# Patient Record
(demographics unavailable — no encounter records)

---

## 2025-06-10 NOTE — HISTORY OF PRESENT ILLNESS
[FreeTextEntry1] : The patient is a 71 year  old woman who was recently noted on Diagnostic mammogram and ultrasound on 4/9/2025 showed a highly suspicious 2.2 cm mass identified at 7:00 to 8:00 6cm from nipple in the right breast and  a 1.3cm indeterminate mass at 7:00 1cm from nipple right breast. Ultrasound guided biopsy recommended.  On 4/14/2025, she had a biopsy of the two right breast abnormalities  at 7-8 oclock and at 7:00.  Pathology at right rbeast 7-8 o'clock showed invasive ductal carcinoma (GATA3 positive), moderately differentiated with focal mucinous features, 10.0 mm in the largest single microscopic measures. In the right 7:00 core biopsy, it showed Invasive ductal carcinoma, moderately differentiated with mucinous features, 5.0mm in the largest single microscopic measure.   It was ER +/SC+/HER 2 negative.  Ki-67 index was 15-20%.   She also had an MRI of bilateral breasts on 4/21/2025, which showed two sites of known carcinoma in right breast, measure 1.7cm at the 7:00 position, 1cm from the nipple and 2.3cm at the 8:00 position, 6cm from the nipple. No other suspicious enhancement in the right breast. No MR evidence for malignancy in the left breast.  On 5/5/2025, She underwent a lumpectomy and sentinel node biopsy: (slides reviewed at [facility]) 1.Breast, right, lumpectomy: Multiple foci of invasive ductal carcinoma with mucinous features and ductal carcinoma in situ (DCIS) Invasive carcinoma is present as two foci measuring 17mm and 11 mm microscopically  Invasive carcinoma is moderately differentiated, Valery grade 2 (3,2,2) DCIS is solid and cribriform types with low to intermediate nuclear grade, calcifications, and mucin production No lymphovascualr invasion identified Invasive carcinoma extends to the posterior margin in the specimen  Invasive carcinoma is <1 mm from the inferior margin in this specimen Invasive carcinoma is 1mm from the anterior margin Invasive carcinoma is > 2mm from remaining margins DCIS extends to the posterior margin in this specimen DCIS is < 1mm from the inferior margin of the specimen DCIS is > 2mm from the remaining margins The surrounding breast parenchyma shows atypical ductal hyperplasia (ADH) biopsy site changes are present  calcifications are present in benign breast tissue 2. Breast, right, additional posterior inferior lateral margin: Invasive ductal carcinoma with mucinous features, morphology similar to part #1 Invasive carcinoma measures 3.5mm microscopically  No lymphovascular invasion identified  No involvement of the surgical margins by carcinoma Invasive carcinoma is > 2mm from all margins calcifications are present in benign breast tissue Benign skeletal muscle 3. 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 But she does live on [address] and so they wanted to meet with Rad onc closer to home. She feels well after surgery. She had a seroma after surgery and breast surgeon drained initially about 60cc, and today they had another follow up with breast surgeon. It had improved and drained about 15ml today.    Her surgery was with Dr Nilton Tang at [facility].  Med/Onc: Dr. Jordan at [facility] initial appointment next week Oncotype: 11 (seen on daughters' phone, she will email results)

## 2025-06-10 NOTE — PHYSICAL EXAM
[General Appearance - Well Developed] : well developed [General Appearance - Alert] : alert [General Appearance - In No Acute Distress] : in no acute distress [Sclera] : the sclera and conjunctiva were normal [Examination Of The Oral Cavity] : the lips and gums were normal [] : no respiratory distress [Heart Rate And Rhythm] : heart rate and rhythm were normal [Skin Color & Pigmentation] : normal skin color and pigmentation [No Focal Deficits] : no focal deficits [Oriented To Time, Place, And Person] : oriented to person, place, and time [de-identified] : healing well right breast/ seroma drained for the second time today with breast surgeon at [facility]

## 2025-06-10 NOTE — HISTORY OF PRESENT ILLNESS
[FreeTextEntry1] : The patient is a 71 year  old woman who was recently noted on Diagnostic mammogram and ultrasound on 4/9/2025 showed a highly suspicious 2.2 cm mass identified at 7:00 to 8:00 6cm from nipple in the right breast and  a 1.3cm indeterminate mass at 7:00 1cm from nipple right breast. Ultrasound guided biopsy recommended.  On 4/14/2025, she had a biopsy of the two right breast abnormalities  at 7-8 oclock and at 7:00.  Pathology at right rbeast 7-8 o'clock showed invasive ductal carcinoma (GATA3 positive), moderately differentiated with focal mucinous features, 10.0 mm in the largest single microscopic measures. In the right 7:00 core biopsy, it showed Invasive ductal carcinoma, moderately differentiated with mucinous features, 5.0mm in the largest single microscopic measure.   It was ER +/[place]+/HER 2 negative.  Ki-67 index was 15-20%.   She also had an MRI of bilateral breasts on 4/21/2025, which showed two sites of known carcinoma in right breast, measure 1.7cm at the 7:00 position, 1cm from the nipple and 2.3cm at the 8:00 position, 6cm from the nipple. No other suspicious enhancement in the right breast. No MR evidence for malignancy in the left breast.  On 5/5/2025, She underwent a lumpectomy and sentinel node biopsy: (slides reviewed at [facility]) 1.Breast, right, lumpectomy: Multiple foci of invasive ductal carcinoma with mucinous features and ductal carcinoma in situ (DCIS) Invasive carcinoma is present as two foci measuring 17mm and 11 mm microscopically  Invasive carcinoma is moderately differentiated, Valery grade 2 (3,2,2) DCIS is solid and cribriform types with low to intermediate nuclear grade, calcifications, and mucin production No lymphovascualr invasion identified Invasive carcinoma extends to the posterior margin in the specimen  Invasive carcinoma is <1 mm from the inferior margin in this specimen Invasive carcinoma is 1mm from the anterior margin Invasive carcinoma is > 2mm from remaining margins DCIS extends to the posterior margin in this specimen DCIS is < 1mm from the inferior margin of the specimen DCIS is > 2mm from the remaining margins The surrounding breast parenchyma shows atypical ductal hyperplasia (ADH) biopsy site changes are present  calcifications are present in benign breast tissue 2. Breast, right, additional posterior inferior lateral margin: Invasive ductal carcinoma with mucinous features, morphology similar to part #1 Invasive carcinoma measures 3.5mm microscopically  No lymphovascular invasion identified  No involvement of the surgical margins by carcinoma Invasive carcinoma is > 2mm from all margins calcifications are present in benign breast tissue Benign skeletal muscle 3. 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 But she does live on [place] and so they wanted to meet with Rad onc closer to home. She feels well after surgery. She had a seroma after surgery and breast surgeon drained initially about 60cc, and today they had another follow up with breast surgeon. It had improved and drained about 15ml today.    Her surgery was with Dr Nilton Tang at [facility].  Med/Onc: Dr. Jordan at [facility] initial appointment next week Oncotype: 11 (seen on daughters' phone, she will email results)

## 2025-06-10 NOTE — DISEASE MANAGEMENT
[IA] : IA [FreeTextEntry4] : right breast IDC, ER+, [place]+, Her2 negative [TTNM] : 1c [NTNM] : 1mi [MTNM] : x

## 2025-06-10 NOTE — REVIEW OF SYSTEMS
[Negative] : Allergic/Immunologic [FreeTextEntry3] : glasses [de-identified] : h/o stroke 2021/ with stent on brilinta and asa [de-identified] : on Brilinta and ASA

## 2025-06-10 NOTE — PHYSICAL EXAM
[General Appearance - Well Developed] : well developed [General Appearance - Alert] : alert [General Appearance - In No Acute Distress] : in no acute distress [Sclera] : the sclera and conjunctiva were normal [Examination Of The Oral Cavity] : the lips and gums were normal [] : no respiratory distress [Heart Rate And Rhythm] : heart rate and rhythm were normal [Skin Color & Pigmentation] : normal skin color and pigmentation [No Focal Deficits] : no focal deficits [Oriented To Time, Place, And Person] : oriented to person, place, and time [de-identified] : healing well right breast/ seroma drained for the second time today with breast surgeon at [facility]

## 2025-06-10 NOTE — REVIEW OF SYSTEMS
[Negative] : Allergic/Immunologic [FreeTextEntry3] : glasses [de-identified] : h/o stroke 2021/ with stent on brilinta and asa [de-identified] : on Brilinta and ASA

## 2025-06-10 NOTE — DISEASE MANAGEMENT
[IA] : IA [FreeTextEntry4] : right breast IDC, ER+, [place]+, Her2 negative [TTNM] : 1c [NTNM] : 1mi [MTNM] : x

## 2025-06-10 NOTE — REVIEW OF SYSTEMS
[Negative] : Allergic/Immunologic [FreeTextEntry3] : glasses [de-identified] : on Brilinta and ASA [de-identified] : h/o stroke 2021/ with stent on brilinta and asa

## 2025-06-10 NOTE — DISEASE MANAGEMENT
[IA] : IA [FreeTextEntry4] : right breast IDC, ER+, [place]+, Her2 negative [TTNM] : 1c [NTNM] : 1mi [MTNM] : x

## 2025-06-10 NOTE — PHYSICAL EXAM
[General Appearance - Well Developed] : well developed [General Appearance - Alert] : alert [General Appearance - In No Acute Distress] : in no acute distress [Sclera] : the sclera and conjunctiva were normal [Examination Of The Oral Cavity] : the lips and gums were normal [] : no respiratory distress [Heart Rate And Rhythm] : heart rate and rhythm were normal [Skin Color & Pigmentation] : normal skin color and pigmentation [No Focal Deficits] : no focal deficits [Oriented To Time, Place, And Person] : oriented to person, place, and time [de-identified] : healing well right breast/ seroma drained for the second time today with breast surgeon at [facility]

## 2025-06-10 NOTE — REVIEW OF SYSTEMS
[Negative] : Allergic/Immunologic [FreeTextEntry3] : glasses [de-identified] : on Brilinta and ASA [de-identified] : h/o stroke 2021/ with stent on brilinta and asa

## 2025-06-10 NOTE — HISTORY OF PRESENT ILLNESS
[FreeTextEntry1] : The patient is a 71 year  old woman who was recently noted on Diagnostic mammogram and ultrasound on 4/9/2025 showed a highly suspicious 2.2 cm mass identified at 7:00 to 8:00 6cm from nipple in the right breast and  a 1.3cm indeterminate mass at 7:00 1cm from nipple right breast. Ultrasound guided biopsy recommended.  On 4/14/2025, she had a biopsy of the two right breast abnormalities  at 7-8 oclock and at 7:00.  Pathology at right rbeast 7-8 o'clock showed invasive ductal carcinoma (GATA3 positive), moderately differentiated with focal mucinous features, 10.0 mm in the largest single microscopic measures. In the right 7:00 core biopsy, it showed Invasive ductal carcinoma, moderately differentiated with mucinous features, 5.0mm in the largest single microscopic measure.   It was ER +/[place]+/HER 2 negative.  Ki-67 index was 15-20%.   She also had an MRI of bilateral breasts on 4/21/2025, which showed two sites of known carcinoma in right breast, measure 1.7cm at the 7:00 position, 1cm from the nipple and 2.3cm at the 8:00 position, 6cm from the nipple. No other suspicious enhancement in the right breast. No MR evidence for malignancy in the left breast.  On 5/5/2025, She underwent a lumpectomy and sentinel node biopsy: (slides reviewed at [facility]) 1.Breast, right, lumpectomy: Multiple foci of invasive ductal carcinoma with mucinous features and ductal carcinoma in situ (DCIS) Invasive carcinoma is present as two foci measuring 17mm and 11 mm microscopically  Invasive carcinoma is moderately differentiated, Valery grade 2 (3,2,2) DCIS is solid and cribriform types with low to intermediate nuclear grade, calcifications, and mucin production No lymphovascualr invasion identified Invasive carcinoma extends to the posterior margin in the specimen  Invasive carcinoma is <1 mm from the inferior margin in this specimen Invasive carcinoma is 1mm from the anterior margin Invasive carcinoma is > 2mm from remaining margins DCIS extends to the posterior margin in this specimen DCIS is < 1mm from the inferior margin of the specimen DCIS is > 2mm from the remaining margins The surrounding breast parenchyma shows atypical ductal hyperplasia (ADH) biopsy site changes are present  calcifications are present in benign breast tissue 2. Breast, right, additional posterior inferior lateral margin: Invasive ductal carcinoma with mucinous features, morphology similar to part #1 Invasive carcinoma measures 3.5mm microscopically  No lymphovascular invasion identified  No involvement of the surgical margins by carcinoma Invasive carcinoma is > 2mm from all margins calcifications are present in benign breast tissue Benign skeletal muscle 3. 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 But she does live on [place] and so they wanted to meet with Rad onc closer to home. She feels well after surgery. She had a seroma after surgery and breast surgeon drained initially about 60cc, and today they had another follow up with breast surgeon. It had improved and drained about 15ml today.    Her surgery was with Dr Nilton Tang at [facility].  Med/Onc: Dr. Jordan at [facility] initial appointment next week Oncotype: 11 (seen on daughters' phone, she will email results)

## 2025-06-10 NOTE — REASON FOR VISIT
[Consideration of Curative Therapy] : consideration of curative therapy for [Breast Cancer] : breast cancer [Family Member] : family member [Interpreters_FullName] : Dahiana [Interpreters_Relationshiptopatient] : daughter [TWNoteComboBox1] : Preston

## 2025-06-10 NOTE — HISTORY OF PRESENT ILLNESS
[FreeTextEntry1] : The patient is a 71 year  old woman who was recently noted on Diagnostic mammogram and ultrasound on 4/9/2025 showed a highly suspicious 2.2 cm mass identified at 7:00 to 8:00 6cm from nipple in the right breast and  a 1.3cm indeterminate mass at 7:00 1cm from nipple right breast. Ultrasound guided biopsy recommended.  On 4/14/2025, she had a biopsy of the two right breast abnormalities  at 7-8 oclock and at 7:00.  Pathology at right rbeast 7-8 o'clock showed invasive ductal carcinoma (GATA3 positive), moderately differentiated with focal mucinous features, 10.0 mm in the largest single microscopic measures. In the right 7:00 core biopsy, it showed Invasive ductal carcinoma, moderately differentiated with mucinous features, 5.0mm in the largest single microscopic measure.   It was ER +/[place]+/HER 2 negative.  Ki-67 index was 15-20%.   She also had an MRI of bilateral breasts on 4/21/2025, which showed two sites of known carcinoma in right breast, measure 1.7cm at the 7:00 position, 1cm from the nipple and 2.3cm at the 8:00 position, 6cm from the nipple. No other suspicious enhancement in the right breast. No MR evidence for malignancy in the left breast.  On 5/5/2025, She underwent a lumpectomy and sentinel node biopsy: (slides reviewed at [facility]) 1.Breast, right, lumpectomy: Multiple foci of invasive ductal carcinoma with mucinous features and ductal carcinoma in situ (DCIS) Invasive carcinoma is present as two foci measuring 17mm and 11 mm microscopically  Invasive carcinoma is moderately differentiated, Valrey grade 2 (3,2,2) DCIS is solid and cribriform types with low to intermediate nuclear grade, calcifications, and mucin production No lymphovascualr invasion identified Invasive carcinoma extends to the posterior margin in the specimen  Invasive carcinoma is <1 mm from the inferior margin in this specimen Invasive carcinoma is 1mm from the anterior margin Invasive carcinoma is > 2mm from remaining margins DCIS extends to the posterior margin in this specimen DCIS is < 1mm from the inferior margin of the specimen DCIS is > 2mm from the remaining margins The surrounding breast parenchyma shows atypical ductal hyperplasia (ADH) biopsy site changes are present  calcifications are present in benign breast tissue 2. Breast, right, additional posterior inferior lateral margin: Invasive ductal carcinoma with mucinous features, morphology similar to part #1 Invasive carcinoma measures 3.5mm microscopically  No lymphovascular invasion identified  No involvement of the surgical margins by carcinoma Invasive carcinoma is > 2mm from all margins calcifications are present in benign breast tissue Benign skeletal muscle 3. 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 But she does live on [place] and so they wanted to meet with Rad onc closer to home. She feels well after surgery. She had a seroma after surgery and breast surgeon drained initially about 60cc, and today they had another follow up with breast surgeon. It had improved and drained about 15ml today.    Her surgery was with Dr Nilton Tang at [facility].  Med/Onc: Dr. Jordan at [facility] initial appointment next week Oncotype: 11 (seen on daughters' phone, she will email results)

## 2025-06-10 NOTE — DISEASE MANAGEMENT
[IA] : IA [FreeTextEntry4] : right breast IDC, ER+, [place]+, Her2 negative [TTNM] : 1c [NTNM] : 1mi [MTNM] : x

## 2025-06-10 NOTE — PHYSICAL EXAM
[General Appearance - Well Developed] : well developed [General Appearance - Alert] : alert [General Appearance - In No Acute Distress] : in no acute distress [Sclera] : the sclera and conjunctiva were normal [Examination Of The Oral Cavity] : the lips and gums were normal [] : no respiratory distress [Heart Rate And Rhythm] : heart rate and rhythm were normal [Skin Color & Pigmentation] : normal skin color and pigmentation [No Focal Deficits] : no focal deficits [Oriented To Time, Place, And Person] : oriented to person, place, and time [de-identified] : healing well right breast/ seroma drained for the second time today with breast surgeon at [facility]

## 2025-06-10 NOTE — END OF VISIT
[FreeTextEntry3] : MD Note: I personally performed the evaluation and management services for this patient. This includes conducting the examination, assessing all conditions, and establishing the plan of care. Today, my ACP, Shima Pena, was here to observe my evaluation and management services for this patient. I agree with the documentation above, which I have reviewed and edited where appropriate.  [Time Spent: ___ minutes] : I have spent [unfilled] minutes of time on the encounter which excludes teaching and separately reported services.

## 2025-07-01 NOTE — REASON FOR VISIT
[Routine On-Treatment] : a routine on-treatment visit for [Breast Cancer] : breast cancer [Family Member] : family member [Patient Declined  Services] : - None: Patient declined  services [Interpreters_FullName] : Dahiana [Interpreters_Relationshiptopatient] : daughter [TWNoteComboBox1] : Preston

## 2025-07-01 NOTE — HISTORY OF PRESENT ILLNESS
[FreeTextEntry1] : 7/1/2025 OTV: 3/16 treatments to the right breast. Patient feeling well. Patient speaks Mandarin and so she facetimed her daughter at this visit. She has started moisturizing the right breast as advised. She denies any pain. Denies any fatigue.

## 2025-07-01 NOTE — PHYSICAL EXAM
[General Appearance - Well Developed] : well developed [General Appearance - Alert] : alert [General Appearance - In No Acute Distress] : in no acute distress [Sclera] : the sclera and conjunctiva were normal [Examination Of The Oral Cavity] : the lips and gums were normal [] : no respiratory distress [Musculoskeletal - Swelling] : no joint swelling [Skin Color & Pigmentation] : normal skin color and pigmentation [No Focal Deficits] : no focal deficits [Oriented To Time, Place, And Person] : oriented to person, place, and time [de-identified] : no erythema noted

## 2025-07-01 NOTE — DISEASE MANAGEMENT
[Pathological] : TNM Stage: p [IA] : IA [FreeTextEntry4] : right breast IDC, ER+, [place]+, Her2 negative [TTNM] : 1c [NTNM] : 1mi [MTNM] : x [de-identified] : [id number] [de-identified] : [id number] [de-identified] : Right Breast

## 2025-07-08 NOTE — HISTORY OF PRESENT ILLNESS
[FreeTextEntry1] : 7/08/2025 OTV: 7/16 treatments to the right breast. Patient feeling well. Denies any fatigue. Denies any breast pain. She is moisturizing twice a day. She has no concerns at this time.   7/1/2025 OTV: 3/16 treatments to the right breast. Patient feeling well. Patient speaks Mandarin and so she facetimed her daughter at this visit. She has started moisturizing the right breast as advised. She denies any pain. Denies any fatigue.

## 2025-07-08 NOTE — DISEASE MANAGEMENT
[Pathological] : TNM Stage: p [IA] : IA [FreeTextEntry4] : right breast IDC, ER+, [place]+, Her2 negative [TTNM] : 1c [NTNM] : 1mi [MTNM] : x [de-identified] : [id number] [de-identified] : [id number] [de-identified] : Right Breast

## 2025-07-08 NOTE — PHYSICAL EXAM
[General Appearance - Well Developed] : well developed [General Appearance - Alert] : alert [General Appearance - In No Acute Distress] : in no acute distress [Sclera] : the sclera and conjunctiva were normal [Examination Of The Oral Cavity] : the lips and gums were normal [] : no respiratory distress [Oriented To Time, Place, And Person] : oriented to person, place, and time [de-identified] : no erythema

## 2025-07-08 NOTE — REASON FOR VISIT
[Routine On-Treatment] : a routine on-treatment visit for [Breast Cancer] : breast cancer [Family Member] : family member [Other: ______] : provided by REYNA [Interpreters_IDNumber] : [id number] [Interpreters_FullName] : Jeff [FreeTextEntry3] : Mandarin [TWNoteComboBox1] : Other

## 2025-07-15 NOTE — REASON FOR VISIT
[Routine On-Treatment] : a routine on-treatment visit for [Breast Cancer] : breast cancer [Family Member] : family member [Other: ______] : provided by REYNA [Interpreters_IDNumber] : [id number] [Interpreters_FullName] : Deepa [FreeTextEntry3] : Mandarin [TWNoteComboBox1] : Other

## 2025-07-15 NOTE — PHYSICAL EXAM
[General Appearance - Well Developed] : well developed [General Appearance - Alert] : alert [General Appearance - In No Acute Distress] : in no acute distress [Sclera] : the sclera and conjunctiva were normal [Examination Of The Oral Cavity] : the lips and gums were normal [] : no respiratory distress [Oriented To Time, Place, And Person] : oriented to person, place, and time [de-identified] : no erythema noted

## 2025-07-15 NOTE — HISTORY OF PRESENT ILLNESS
[FreeTextEntry1] : 7/15/2025 OTV: 12/16 treatments to the right breast. Patient feeling well. Denies any fatigue. Denies any breast pain. She is moisturizing twice a day. She has no concerns at this time. Reviewed the process of her last day concerning discharge instructions.   7/08/2025 OTV: 7/16 treatments to the right breast. Patient feeling well. Denies any fatigue. Denies any breast pain. She is moisturizing twice a day. She has no concerns at this time.   7/1/2025 OTV: 3/16 treatments to the right breast. Patient feeling well. Patient speaks Mandarin and so she facetimed her daughter at this visit. She has started moisturizing the right breast as advised. She denies any pain. Denies any fatigue.

## 2025-07-15 NOTE — PHYSICAL EXAM
[General Appearance - Well Developed] : well developed [General Appearance - Alert] : alert [General Appearance - In No Acute Distress] : in no acute distress [Sclera] : the sclera and conjunctiva were normal [Examination Of The Oral Cavity] : the lips and gums were normal [] : no respiratory distress [Oriented To Time, Place, And Person] : oriented to person, place, and time [de-identified] : no erythema noted

## 2025-07-15 NOTE — REASON FOR VISIT
[Routine On-Treatment] : a routine on-treatment visit for [Breast Cancer] : breast cancer [Family Member] : family member [Other: ______] : provided by REYNA [Interpreters_IDNumber] : [id number] [Interpreters_FullName] : Deepa [FreeTextEntry3] : Mandarin [TWNoteComboBox1] : Other

## 2025-07-15 NOTE — DISEASE MANAGEMENT
[Pathological] : TNM Stage: p [IA] : IA [FreeTextEntry4] : right breast IDC, ER+, ID+, Her2 negative [TTNM] : 1c [NTNM] : 1mi [MTNM] : x [de-identified] : 1437cGy [de-identified] : [id number] [de-identified] : Right Breast

## 2025-07-15 NOTE — DISEASE MANAGEMENT
[Pathological] : TNM Stage: p [IA] : IA [FreeTextEntry4] : right breast IDC, ER+, CT+, Her2 negative [TTNM] : 1c [NTNM] : 1mi [MTNM] : x [de-identified] : 0924cGy [de-identified] : [id number] [de-identified] : Right Breast